# Patient Record
Sex: MALE | Race: WHITE | URBAN - METROPOLITAN AREA
[De-identification: names, ages, dates, MRNs, and addresses within clinical notes are randomized per-mention and may not be internally consistent; named-entity substitution may affect disease eponyms.]

---

## 2018-10-04 ENCOUNTER — TELEPHONE (OUTPATIENT)
Dept: PEDIATRICS | Facility: CLINIC | Age: 58
End: 2018-10-04

## 2018-10-04 NOTE — TELEPHONE ENCOUNTER
10/04/2018     Call Regarding Onboarding  [facility] choices     Attempt 1    Message NO VM    Comments:       Outreach   Sharona Bah

## 2018-11-21 NOTE — TELEPHONE ENCOUNTER
11/21/2018    Call Regarding Onboarding P1 Other    Attempt 2    Message on voicemail not set up    Comments:       Outreach   CWR

## 2018-12-05 NOTE — TELEPHONE ENCOUNTER
12/5/2018    Call Regarding Onboarding [employer] CHOICES    Attempt 3    Message     Comments:  NOT SET UP          Outreach   AT